# Patient Record
Sex: FEMALE | Race: ASIAN | NOT HISPANIC OR LATINO
[De-identification: names, ages, dates, MRNs, and addresses within clinical notes are randomized per-mention and may not be internally consistent; named-entity substitution may affect disease eponyms.]

---

## 2017-05-24 ENCOUNTER — TRANSCRIPTION ENCOUNTER (OUTPATIENT)
Age: 32
End: 2017-05-24

## 2018-10-26 ENCOUNTER — TRANSCRIPTION ENCOUNTER (OUTPATIENT)
Age: 33
End: 2018-10-26

## 2018-12-30 ENCOUNTER — EMERGENCY (EMERGENCY)
Facility: HOSPITAL | Age: 33
LOS: 1 days | Discharge: HOME | End: 2018-12-30
Attending: EMERGENCY MEDICINE | Admitting: EMERGENCY MEDICINE
Payer: COMMERCIAL

## 2018-12-30 VITALS
TEMPERATURE: 98 F | OXYGEN SATURATION: 100 % | SYSTOLIC BLOOD PRESSURE: 134 MMHG | HEART RATE: 76 BPM | RESPIRATION RATE: 18 BRPM | DIASTOLIC BLOOD PRESSURE: 79 MMHG

## 2018-12-30 VITALS
RESPIRATION RATE: 18 BRPM | DIASTOLIC BLOOD PRESSURE: 90 MMHG | TEMPERATURE: 98 F | SYSTOLIC BLOOD PRESSURE: 147 MMHG | OXYGEN SATURATION: 98 % | HEART RATE: 85 BPM

## 2018-12-30 LAB
ALBUMIN SERPL ELPH-MCNC: 4.9 G/DL — SIGNIFICANT CHANGE UP (ref 3.3–5)
ALP SERPL-CCNC: 62 U/L — SIGNIFICANT CHANGE UP (ref 40–120)
ALT FLD-CCNC: 50 U/L — HIGH (ref 4–33)
APPEARANCE UR: CLEAR — SIGNIFICANT CHANGE UP
AST SERPL-CCNC: 29 U/L — SIGNIFICANT CHANGE UP (ref 4–32)
BACTERIA # UR AUTO: SIGNIFICANT CHANGE UP
BASOPHILS # BLD AUTO: 0.09 K/UL — SIGNIFICANT CHANGE UP (ref 0–0.2)
BASOPHILS NFR BLD AUTO: 0.5 % — SIGNIFICANT CHANGE UP (ref 0–2)
BILIRUB SERPL-MCNC: 0.3 MG/DL — SIGNIFICANT CHANGE UP (ref 0.2–1.2)
BILIRUB UR-MCNC: NEGATIVE — SIGNIFICANT CHANGE UP
BLOOD UR QL VISUAL: HIGH
BUN SERPL-MCNC: 12 MG/DL — SIGNIFICANT CHANGE UP (ref 7–23)
CALCIUM SERPL-MCNC: 9.8 MG/DL — SIGNIFICANT CHANGE UP (ref 8.4–10.5)
CHLORIDE SERPL-SCNC: 99 MMOL/L — SIGNIFICANT CHANGE UP (ref 98–107)
CO2 SERPL-SCNC: 26 MMOL/L — SIGNIFICANT CHANGE UP (ref 22–31)
COLOR SPEC: COLORLESS — SIGNIFICANT CHANGE UP
CREAT SERPL-MCNC: 0.7 MG/DL — SIGNIFICANT CHANGE UP (ref 0.5–1.3)
EOSINOPHIL # BLD AUTO: 0.04 K/UL — SIGNIFICANT CHANGE UP (ref 0–0.5)
EOSINOPHIL NFR BLD AUTO: 0.2 % — SIGNIFICANT CHANGE UP (ref 0–6)
GLUCOSE SERPL-MCNC: 116 MG/DL — HIGH (ref 70–99)
GLUCOSE UR-MCNC: NEGATIVE — SIGNIFICANT CHANGE UP
HCT VFR BLD CALC: 48.1 % — HIGH (ref 34.5–45)
HGB BLD-MCNC: 14.9 G/DL — SIGNIFICANT CHANGE UP (ref 11.5–15.5)
HYALINE CASTS # UR AUTO: NEGATIVE — SIGNIFICANT CHANGE UP
IMM GRANULOCYTES # BLD AUTO: 0.11 # — SIGNIFICANT CHANGE UP
IMM GRANULOCYTES NFR BLD AUTO: 0.6 % — SIGNIFICANT CHANGE UP (ref 0–1.5)
KETONES UR-MCNC: NEGATIVE — SIGNIFICANT CHANGE UP
LEUKOCYTE ESTERASE UR-ACNC: NEGATIVE — SIGNIFICANT CHANGE UP
LIDOCAIN IGE QN: 35.2 U/L — SIGNIFICANT CHANGE UP (ref 7–60)
LYMPHOCYTES # BLD AUTO: 17 % — SIGNIFICANT CHANGE UP (ref 13–44)
LYMPHOCYTES # BLD AUTO: 2.94 K/UL — SIGNIFICANT CHANGE UP (ref 1–3.3)
MCHC RBC-ENTMCNC: 25.9 PG — LOW (ref 27–34)
MCHC RBC-ENTMCNC: 31 % — LOW (ref 32–36)
MCV RBC AUTO: 83.5 FL — SIGNIFICANT CHANGE UP (ref 80–100)
MONOCYTES # BLD AUTO: 0.69 K/UL — SIGNIFICANT CHANGE UP (ref 0–0.9)
MONOCYTES NFR BLD AUTO: 4 % — SIGNIFICANT CHANGE UP (ref 2–14)
NEUTROPHILS # BLD AUTO: 13.39 K/UL — HIGH (ref 1.8–7.4)
NEUTROPHILS NFR BLD AUTO: 77.7 % — HIGH (ref 43–77)
NITRITE UR-MCNC: NEGATIVE — SIGNIFICANT CHANGE UP
NRBC # FLD: 0 — SIGNIFICANT CHANGE UP
OB PNL STL: NEGATIVE — SIGNIFICANT CHANGE UP
PH UR: 6.5 — SIGNIFICANT CHANGE UP (ref 5–8)
PLATELET # BLD AUTO: 467 K/UL — HIGH (ref 150–400)
PMV BLD: 10 FL — SIGNIFICANT CHANGE UP (ref 7–13)
POTASSIUM SERPL-MCNC: 4.1 MMOL/L — SIGNIFICANT CHANGE UP (ref 3.5–5.3)
POTASSIUM SERPL-SCNC: 4.1 MMOL/L — SIGNIFICANT CHANGE UP (ref 3.5–5.3)
PROT SERPL-MCNC: 8.4 G/DL — HIGH (ref 6–8.3)
PROT UR-MCNC: NEGATIVE — SIGNIFICANT CHANGE UP
RBC # BLD: 5.76 M/UL — HIGH (ref 3.8–5.2)
RBC # FLD: 13.5 % — SIGNIFICANT CHANGE UP (ref 10.3–14.5)
RBC CASTS # UR COMP ASSIST: SIGNIFICANT CHANGE UP (ref 0–?)
SODIUM SERPL-SCNC: 137 MMOL/L — SIGNIFICANT CHANGE UP (ref 135–145)
SP GR SPEC: 1.01 — SIGNIFICANT CHANGE UP (ref 1–1.04)
SQUAMOUS # UR AUTO: SIGNIFICANT CHANGE UP
UROBILINOGEN FLD QL: NORMAL — SIGNIFICANT CHANGE UP
WBC # BLD: 17.26 K/UL — HIGH (ref 3.8–10.5)
WBC # FLD AUTO: 17.26 K/UL — HIGH (ref 3.8–10.5)
WBC UR QL: SIGNIFICANT CHANGE UP (ref 0–?)

## 2018-12-30 PROCEDURE — 99284 EMERGENCY DEPT VISIT MOD MDM: CPT

## 2018-12-30 RX ORDER — SODIUM CHLORIDE 9 MG/ML
1000 INJECTION INTRAMUSCULAR; INTRAVENOUS; SUBCUTANEOUS ONCE
Qty: 0 | Refills: 0 | Status: COMPLETED | OUTPATIENT
Start: 2018-12-30 | End: 2018-12-30

## 2018-12-30 RX ORDER — CIPROFLOXACIN LACTATE 400MG/40ML
500 VIAL (ML) INTRAVENOUS ONCE
Qty: 0 | Refills: 0 | Status: COMPLETED | OUTPATIENT
Start: 2018-12-30 | End: 2018-12-30

## 2018-12-30 RX ORDER — ONDANSETRON 8 MG/1
4 TABLET, FILM COATED ORAL ONCE
Qty: 0 | Refills: 0 | Status: COMPLETED | OUTPATIENT
Start: 2018-12-30 | End: 2018-12-30

## 2018-12-30 RX ADMIN — Medication 30 MILLILITER(S): at 21:10

## 2018-12-30 RX ADMIN — SODIUM CHLORIDE 1000 MILLILITER(S): 9 INJECTION INTRAMUSCULAR; INTRAVENOUS; SUBCUTANEOUS at 21:10

## 2018-12-30 RX ADMIN — Medication 500 MILLIGRAM(S): at 23:40

## 2018-12-30 RX ADMIN — ONDANSETRON 4 MILLIGRAM(S): 8 TABLET, FILM COATED ORAL at 21:10

## 2018-12-30 NOTE — ED ADULT NURSE NOTE - MODE OF DISCHARGE
Ambulatory
I personally performed the services described in the documentation, reviewed the documentation recorded by the scribe in my presence and it accurately and completely records my words and action.

## 2018-12-30 NOTE — ED PROVIDER NOTE - ATTENDING CONTRIBUTION TO CARE
agree with resident note  "33F presenting w/ bloody stool. Notes loose, bloody stool and drops of blood in urine for past 3 days a/w epigastric pain, non-radiating, and nausea. Recently completed course of augmentin 10 day course preceded by levaquin for 5 days for sinusitis."  Denies syncope, CP, SOB.  Denies rash    PE: well appearing; VSS: CTAB/L; s1 s2 no m/r/g abd soft/NT/ND ext: no edema    Imp: likely abx induced diarrhea; infectious diarrhea; will check labs for H/H, platelets if stable will send home on [facility]

## 2018-12-30 NOTE — ED PROVIDER NOTE - MEDICAL DECISION MAKING DETAILS
33F p/w bloody stools and hematuria for past 3 days in setting of recent abx use and travel, cocnerning for infectious diarrhea / underlying infxn  -labs, occult, supportive tx

## 2018-12-30 NOTE — ED PROVIDER NOTE - OBJECTIVE STATEMENT
33F presenting w/ bloody stool. Notes loose, bloody stool and drops of blood in urine for past 3 days a/w epigastric pain, non-radiating, and nausea. Recently completed course of augmentin 10 day course preceded by levaquin for 5 days for sinusitis. Denies F, CP/SOB, dysuria. LMP 2 months ago. Also traveled to [place] last month.

## 2018-12-30 NOTE — ED PROVIDER NOTE - NSFOLLOWUPINSTRUCTIONS_ED_ALL_ED_FT
Take cipro twice a day for 5 days    Drink plenty of fluids. Gatorade (or similar) is a good option as it provides electrolyte replacement. Eat foods that are dry and bland like pasta, bread, crackers, and rice.

## 2018-12-30 NOTE — ED ADULT NURSE NOTE - OBJECTIVE STATEMENT
pt on bed aox3 c/o epigastric, umbilical, suprapubic area pain for 1 week worse today. with nausea, blood in the urine and stool that started today. claimed had BM but hard to passed for few days. denies HA dizziness SOB palpitation vomiting diarrhea, denies PMHx MD at bedside eval the pt. will monitor

## 2018-12-30 NOTE — ED ADULT NURSE NOTE - NSIMPLEMENTINTERV_GEN_ALL_ED
Implemented All Universal Safety Interventions:  [place] to call system. Call bell, personal items and telephone within reach. Instruct patient to call for assistance. Room bathroom lighting operational. Non-slip footwear when patient is off stretcher. Physically safe environment: no spills, clutter or unnecessary equipment. Stretcher in lowest position, wheels locked, appropriate side rails in place.

## 2018-12-31 RX ORDER — CIPROFLOXACIN LACTATE 400MG/40ML
1 VIAL (ML) INTRAVENOUS
Qty: 10 | Refills: 0 | OUTPATIENT
Start: 2018-12-31 | End: 2019-01-04

## 2018-12-31 NOTE — ED POST DISCHARGE NOTE - RESULT SUMMARY
unsubmitted rx noted in EMR. I logged pt's pharmacy information and submitted rx. Encouraged pt to make followup appt with PMD.

## 2022-11-22 ENCOUNTER — OFFICE (OUTPATIENT)
Dept: URBAN - METROPOLITAN AREA CLINIC 90 | Facility: CLINIC | Age: 37
Setting detail: OPHTHALMOLOGY
End: 2022-11-22
Payer: COMMERCIAL

## 2022-11-22 DIAGNOSIS — H16.423: ICD-10-CM

## 2022-11-22 DIAGNOSIS — H16.223: ICD-10-CM

## 2022-11-22 DIAGNOSIS — H40.013: ICD-10-CM

## 2022-11-22 PROCEDURE — 92020 GONIOSCOPY: CPT | Performed by: OPHTHALMOLOGY

## 2022-11-22 PROCEDURE — 92014 COMPRE OPH EXAM EST PT 1/>: CPT | Performed by: OPHTHALMOLOGY

## 2022-11-22 PROCEDURE — 92133 CPTRZD OPH DX IMG PST SGM ON: CPT | Performed by: OPHTHALMOLOGY

## 2022-11-22 ASSESSMENT — REFRACTION_MANIFEST
OD_SPHERE: -2.75
OD_VA1: 20/20-
OD_SPHERE: -3.00
OS_CYLINDER: SPH
OD_VA1: 20/20
OD_SPHERE: -3.00
OD_VA1: 20/20
OS_VA1: 20/20-
OS_SPHERE: -2.75
OS_VA1: 20/20
OD_SPHERE: -2.75
OD_CYLINDER: SPH
OD_AXIS: 148
OD_CYLINDER: -0.75
OS_SPHERE: -2.75
OS_SPHERE: -2.75
OS_VA1: 20/20
OS_SPHERE: -2.75

## 2022-11-22 ASSESSMENT — REFRACTION_AUTOREFRACTION
OD_CYLINDER: -0.50
OD_AXIS: 003
OS_CYLINDER: -0.25
OD_SPHERE: -2.75
OS_AXIS: 026
OS_SPHERE: -2.50

## 2022-11-22 ASSESSMENT — VISUAL ACUITY
OS_BCVA: 20/20
OD_BCVA: 20/20-1

## 2022-11-22 ASSESSMENT — REFRACTION_CURRENTRX
OS_VPRISM_DIRECTION: SV
OS_SPHERE: -2.75
OD_OVR_VA: 20/
OS_OVR_VA: 20/
OD_AXIS: 180
OD_SPHERE: -3.00
OS_CYLINDER: 0.00
OD_AXIS: 180
OD_CYLINDER: 0.00
OS_CYLINDER: SPH
OD_SPHERE: -3.00
OS_OVR_VA: 20/
OS_AXIS: 180
OS_SPHERE: -2.75
OD_VPRISM_DIRECTION: SV
OD_VPRISM_DIRECTION: SV
OD_CYLINDER: 0.00
OS_VPRISM_DIRECTION: SV
OD_SPHERE: -3.00
OD_CYLINDER: 0.00
OS_AXIS: 80
OS_AXIS: 180
OS_VPRISM_DIRECTION: SV
OD_OVR_VA: 20/
OD_VPRISM_DIRECTION: SV
OS_SPHERE: -2.75
OS_CYLINDER: 0.00
OD_OVR_VA: 20/
OD_AXIS: 180
OS_OVR_VA: 20/

## 2022-11-22 ASSESSMENT — PACHYMETRY
OD_CT_UM: 580
OS_CT_CORRECTION: -3
OS_CT_UM: 581
OD_CT_CORRECTION: -3

## 2022-11-22 ASSESSMENT — SUPERFICIAL PUNCTATE KERATITIS (SPK)
OD_SPK: T
OS_SPK: T

## 2022-11-22 ASSESSMENT — VASCULARIZATION
OD_VASCULARIZATION: SUPERIOR PANNUS
OS_VASCULARIZATION: SUPERIOR PANNUS

## 2022-11-22 ASSESSMENT — KERATOMETRY
OD_K2POWER_DIOPTERS: 45.00
OS_AXISANGLE_DEGREES: 106
OD_AXISANGLE_DEGREES: 077
OD_K1POWER_DIOPTERS: 44.00
METHOD_AUTO_MANUAL: AUTO
OS_K2POWER_DIOPTERS: 43.25
OS_K1POWER_DIOPTERS: 42.25

## 2022-11-22 ASSESSMENT — AXIALLENGTH_DERIVED
OD_AL: 24.4216
OS_AL: 24.9657
OD_AL: 24.4739

## 2022-11-22 ASSESSMENT — TONOMETRY
OS_IOP_MMHG: 20
OD_IOP_MMHG: 20

## 2022-11-22 ASSESSMENT — SPHEQUIV_DERIVED
OD_SPHEQUIV: -3.125
OD_SPHEQUIV: -3
OS_SPHEQUIV: -2.625

## 2022-11-22 ASSESSMENT — CONFRONTATIONAL VISUAL FIELD TEST (CVF)
OS_FINDINGS: FULL
OD_FINDINGS: FULL

## 2023-08-21 ENCOUNTER — NON-APPOINTMENT (OUTPATIENT)
Age: 38
End: 2023-08-21

## 2023-08-28 ENCOUNTER — APPOINTMENT (OUTPATIENT)
Dept: CARDIOLOGY | Facility: CLINIC | Age: 38
End: 2023-08-28
Payer: COMMERCIAL

## 2023-08-28 ENCOUNTER — NON-APPOINTMENT (OUTPATIENT)
Age: 38
End: 2023-08-28

## 2023-08-28 VITALS
HEART RATE: 93 BPM | OXYGEN SATURATION: 99 % | DIASTOLIC BLOOD PRESSURE: 80 MMHG | SYSTOLIC BLOOD PRESSURE: 136 MMHG | WEIGHT: 190 LBS

## 2023-08-28 VITALS — SYSTOLIC BLOOD PRESSURE: 118 MMHG | DIASTOLIC BLOOD PRESSURE: 78 MMHG

## 2023-08-28 VITALS — DIASTOLIC BLOOD PRESSURE: 80 MMHG | SYSTOLIC BLOOD PRESSURE: 130 MMHG

## 2023-08-28 VITALS — SYSTOLIC BLOOD PRESSURE: 132 MMHG | DIASTOLIC BLOOD PRESSURE: 80 MMHG

## 2023-08-28 DIAGNOSIS — R42 DIZZINESS AND GIDDINESS: ICD-10-CM

## 2023-08-28 PROCEDURE — 93000 ELECTROCARDIOGRAM COMPLETE: CPT

## 2023-08-28 PROCEDURE — 99203 OFFICE O/P NEW LOW 30 MIN: CPT

## 2023-09-08 NOTE — HISTORY OF PRESENT ILLNESS
[FreeTextEntry1] : Dear Jenny, Thank you for referring her for cardiovascular evaluation.  She is a 38-year-old With a history of sleep apnea who is being seen for an episode of dizziness. She describes an episode of feeling "off" while sitting down lasting for minutes and resolving spontaneously.  There were no associated symptoms during these episodes. She is able to go about her usual activities including routine aerobic activity of high intensity training 5 to 6 days a week without any chest pain, shortness of breath or dizzy spells. She does have a history of borderline diabetes mellitus and hyperlipidemia.  There is no family history of premature coronary artery disease, but her father has atrial fibrillation and coronary disease. She currently takes no medications. No smoking history. No known allergies.

## 2023-09-08 NOTE — DISCUSSION/SUMMARY
[FreeTextEntry1] : She is a 38-year-old with a history of lightheadedness/dizziness episode that appeared unprovoked.  She is not orthostatic on examination today. I have scheduled her for an echocardiogram to exclude any structural heart disease Or pericardial effusion that could cause the symptoms. A cardiac monitor will be placed in order to exclude any transient arrhythmias that may explain her symptoms, though this is less likely.  [EKG obtained to assist in diagnosis and management of assessed problem(s)] : EKG obtained to assist in diagnosis and management of assessed problem(s)

## 2023-09-28 ENCOUNTER — NON-APPOINTMENT (OUTPATIENT)
Age: 38
End: 2023-09-28

## 2023-09-29 ENCOUNTER — APPOINTMENT (OUTPATIENT)
Dept: CARDIOLOGY | Facility: CLINIC | Age: 38
End: 2023-09-29
Payer: COMMERCIAL

## 2023-09-29 PROCEDURE — 93306 TTE W/DOPPLER COMPLETE: CPT

## 2023-11-28 ENCOUNTER — OFFICE (OUTPATIENT)
Dept: URBAN - METROPOLITAN AREA CLINIC 90 | Facility: CLINIC | Age: 38
Setting detail: OPHTHALMOLOGY
End: 2023-11-28
Payer: COMMERCIAL

## 2023-11-28 DIAGNOSIS — H40.013: ICD-10-CM

## 2023-11-28 DIAGNOSIS — H52.13: ICD-10-CM

## 2023-11-28 DIAGNOSIS — H16.423: ICD-10-CM

## 2023-11-28 DIAGNOSIS — H52.7: ICD-10-CM

## 2023-11-28 DIAGNOSIS — H16.223: ICD-10-CM

## 2023-11-28 PROCEDURE — 92083 EXTENDED VISUAL FIELD XM: CPT | Performed by: OPHTHALMOLOGY

## 2023-11-28 PROCEDURE — 92014 COMPRE OPH EXAM EST PT 1/>: CPT | Performed by: OPHTHALMOLOGY

## 2023-11-28 PROCEDURE — 92015 DETERMINE REFRACTIVE STATE: CPT | Performed by: OPHTHALMOLOGY

## 2023-11-28 ASSESSMENT — VASCULARIZATION
OS_VASCULARIZATION: SUPERIOR PANNUS
OD_VASCULARIZATION: SUPERIOR PANNUS

## 2023-11-28 ASSESSMENT — CONFRONTATIONAL VISUAL FIELD TEST (CVF)
OD_FINDINGS: FULL
OS_FINDINGS: FULL

## 2023-11-28 ASSESSMENT — SUPERFICIAL PUNCTATE KERATITIS (SPK)
OD_SPK: T
OS_SPK: T

## 2023-11-30 PROBLEM — H52.7 REFRACTIVE ERROR: Status: ACTIVE | Noted: 2023-11-28

## 2023-12-07 ASSESSMENT — AXIALLENGTH_DERIVED
OD_AL: 24.6321
OD_AL: 24.6321
OD_AL: 24.4739
OS_AL: 24.5731

## 2023-12-07 ASSESSMENT — REFRACTION_CURRENTRX
OS_SPHERE: -2.75
OS_SPHERE: -2.75
OD_SPHERE: -3.00
OD_VPRISM_DIRECTION: SV
OS_CYLINDER: SPH
OD_VPRISM_DIRECTION: SV
OS_AXIS: 180
OD_CYLINDER: 0.00
OD_SPHERE: -3.00
OD_CYLINDER: 0.00
OS_VPRISM_DIRECTION: SV
OS_CYLINDER: 0.00
OD_AXIS: 180
OS_AXIS: 80
OS_OVR_VA: 20/
OS_SPHERE: -2.75
OS_VPRISM_DIRECTION: SV
OD_OVR_VA: 20/
OD_VPRISM_DIRECTION: SV
OD_CYLINDER: SPH
OD_OVR_VA: 20/
OS_SPHERE: -2.75
OS_VPRISM_DIRECTION: SV
OD_AXIS: 180
OS_OVR_VA: 20/
OS_CYLINDER: SPH
OD_OVR_VA: 20/
OD_SPHERE: -3.00
OD_SPHERE: -3.00
OS_AXIS: 180
OS_SPHERE: -2.75
OD_CYLINDER: SPH
OD_VPRISM_DIRECTION: SV
OS_CYLINDER: 0.00
OS_CYLINDER: SPH
OS_VPRISM_DIRECTION: SV
OD_VPRISM_DIRECTION: SV
OD_SPHERE: -3.00
OD_CYLINDER: 0.00
OS_OVR_VA: 20/
OS_VPRISM_DIRECTION: SV
OD_AXIS: 180

## 2023-12-07 ASSESSMENT — KERATOMETRY
OS_K1POWER_DIOPTERS: 43.75
OD_AXISANGLE_DEGREES: 082
METHOD_AUTO_MANUAL: AUTO
OD_K1POWER_DIOPTERS: 44.00
OD_K2POWER_DIOPTERS: 45.00
OS_K2POWER_DIOPTERS: 44.75
OS_AXISANGLE_DEGREES: 102

## 2023-12-07 ASSESSMENT — REFRACTION_MANIFEST
OD_SPHERE: -3.25
OS_SPHERE: -2.75
OS_VA1: 20/20
OS_SPHERE: -2.75
OD_CYLINDER: -0.75
OD_CYLINDER: SPH
OS_SPHERE: -2.75
OD_VA1: 20/20
OD_VA1: 20/20
OS_CYLINDER: SPH
OS_VA1: 20/20
OS_VA1: 20/20
OD_AXIS: 010
OD_CYLINDER: -0.50
OD_SPHERE: -3.00
OS_CYLINDER: SPH
OS_VA1: 20/20-
OS_SPHERE: -2.75
OD_SPHERE: -2.75
OS_SPHERE: -3.00
OD_AXIS: 148
OD_VA1: 20/20-
OD_SPHERE: -2.75
OD_VA1: 20/20
OD_SPHERE: -3.00

## 2023-12-07 ASSESSMENT — REFRACTION_AUTOREFRACTION
OS_CYLINDER: -0.25
OS_SPHERE: -3.00
OD_SPHERE: -3.25
OD_CYLINDER: -0.50
OS_AXIS: 168
OD_AXIS: 009

## 2023-12-07 ASSESSMENT — SPHEQUIV_DERIVED
OD_SPHEQUIV: -3.5
OD_SPHEQUIV: -3.5
OS_SPHEQUIV: -3.125
OD_SPHEQUIV: -3.125

## 2024-02-01 ENCOUNTER — TRANSCRIPTION ENCOUNTER (OUTPATIENT)
Age: 39
End: 2024-02-01

## 2024-02-01 ENCOUNTER — APPOINTMENT (OUTPATIENT)
Dept: INTERNAL MEDICINE | Facility: CLINIC | Age: 39
End: 2024-02-01
Payer: COMMERCIAL

## 2024-02-01 VITALS
OXYGEN SATURATION: 98 % | BODY MASS INDEX: 37.19 KG/M2 | HEART RATE: 95 BPM | DIASTOLIC BLOOD PRESSURE: 87 MMHG | TEMPERATURE: 98.1 F | SYSTOLIC BLOOD PRESSURE: 169 MMHG | HEIGHT: 61 IN | WEIGHT: 197 LBS

## 2024-02-01 VITALS — SYSTOLIC BLOOD PRESSURE: 150 MMHG | DIASTOLIC BLOOD PRESSURE: 90 MMHG

## 2024-02-01 DIAGNOSIS — R73.03 PREDIABETES.: ICD-10-CM

## 2024-02-01 DIAGNOSIS — Z12.4 ENCOUNTER FOR SCREENING FOR MALIGNANT NEOPLASM OF CERVIX: ICD-10-CM

## 2024-02-01 DIAGNOSIS — G47.30 SLEEP APNEA, UNSPECIFIED: ICD-10-CM

## 2024-02-01 DIAGNOSIS — E55.9 VITAMIN D DEFICIENCY, UNSPECIFIED: ICD-10-CM

## 2024-02-01 DIAGNOSIS — Z00.00 ENCOUNTER FOR GENERAL ADULT MEDICAL EXAMINATION W/OUT ABNORMAL FINDINGS: ICD-10-CM

## 2024-02-01 PROCEDURE — 99385 PREV VISIT NEW AGE 18-39: CPT

## 2024-02-01 NOTE — HISTORY OF PRESENT ILLNESS
[FreeTextEntry1] : annual [de-identified] : 37 yo F with HAM, hx dizziness presents for annual.   Pt has had workup by cardiology that has been unremarkable. She also had MRI brain done with neuro that was also unremarkable. She still does get occasional dizziness when she is at rest. She does weight training and cardio daily. She denies feeling any dizziness during those times. Denies overwhelming stressors.   HAM-- wears CPAP  Needs new GYN, not UTD pap.

## 2024-02-05 ENCOUNTER — TRANSCRIPTION ENCOUNTER (OUTPATIENT)
Age: 39
End: 2024-02-05

## 2024-02-05 LAB
25(OH)D3 SERPL-MCNC: 24.1 NG/ML
ALBUMIN SERPL ELPH-MCNC: 5.1 G/DL
ALP BLD-CCNC: 62 U/L
ALT SERPL-CCNC: 33 U/L
ANION GAP SERPL CALC-SCNC: 13 MMOL/L
AST SERPL-CCNC: 26 U/L
BASOPHILS # BLD AUTO: 0.1 K/UL
BASOPHILS NFR BLD AUTO: 1.4 %
BILIRUB SERPL-MCNC: 0.3 MG/DL
BUN SERPL-MCNC: 18 MG/DL
CALCIUM SERPL-MCNC: 9.4 MG/DL
CHLORIDE SERPL-SCNC: 103 MMOL/L
CHOLEST SERPL-MCNC: 213 MG/DL
CO2 SERPL-SCNC: 23 MMOL/L
CREAT SERPL-MCNC: 0.72 MG/DL
EGFR: 110 ML/MIN/1.73M2
EOSINOPHIL # BLD AUTO: 0.16 K/UL
EOSINOPHIL NFR BLD AUTO: 2.3 %
ESTIMATED AVERAGE GLUCOSE: 128 MG/DL
GLUCOSE SERPL-MCNC: 119 MG/DL
HBA1C MFR BLD HPLC: 6.1 %
HCT VFR BLD CALC: 42.1 %
HDLC SERPL-MCNC: 51 MG/DL
HGB BLD-MCNC: 14 G/DL
IMM GRANULOCYTES NFR BLD AUTO: 0.4 %
LDLC SERPL CALC-MCNC: 142 MG/DL
LYMPHOCYTES # BLD AUTO: 2.32 K/UL
LYMPHOCYTES NFR BLD AUTO: 33.1 %
MAN DIFF?: NORMAL
MCHC RBC-ENTMCNC: 27.3 PG
MCHC RBC-ENTMCNC: 33.3 GM/DL
MCV RBC AUTO: 82.2 FL
MONOCYTES # BLD AUTO: 0.53 K/UL
MONOCYTES NFR BLD AUTO: 7.6 %
NEUTROPHILS # BLD AUTO: 3.86 K/UL
NEUTROPHILS NFR BLD AUTO: 55.2 %
NONHDLC SERPL-MCNC: 162 MG/DL
PLATELET # BLD AUTO: 395 K/UL
POTASSIUM SERPL-SCNC: 4.5 MMOL/L
PROT SERPL-MCNC: 7.5 G/DL
RBC # BLD: 5.12 M/UL
RBC # FLD: 13.7 %
SODIUM SERPL-SCNC: 138 MMOL/L
TRIGL SERPL-MCNC: 112 MG/DL
TSH SERPL-ACNC: 2.28 UIU/ML
VIT B12 SERPL-MCNC: 884 PG/ML
WBC # FLD AUTO: 7 K/UL

## 2024-03-14 ENCOUNTER — APPOINTMENT (OUTPATIENT)
Dept: OBGYN | Facility: CLINIC | Age: 39
End: 2024-03-14
Payer: COMMERCIAL

## 2024-03-14 VITALS — SYSTOLIC BLOOD PRESSURE: 154 MMHG | WEIGHT: 195 LBS | DIASTOLIC BLOOD PRESSURE: 90 MMHG | BODY MASS INDEX: 36.85 KG/M2

## 2024-03-14 DIAGNOSIS — Z01.419 ENCOUNTER FOR GYNECOLOGICAL EXAMINATION (GENERAL) (ROUTINE) W/OUT ABNORMAL FINDINGS: ICD-10-CM

## 2024-03-14 PROCEDURE — 99385 PREV VISIT NEW AGE 18-39: CPT

## 2024-03-14 PROCEDURE — G0444 DEPRESSION SCREEN ANNUAL: CPT

## 2024-03-14 NOTE — HISTORY OF PRESENT ILLNESS
[FreeTextEntry1] : LMP  2/29 39yo G0 here for wellness and pap, last pap 2 years ago. Reg menses, no dysmenorrhea or heavy flow. Not currently SA, not considering pregnancy. Sometimes has "bump" in vulvar area with periods. Also hx of elev BPs at MD offices, reports BPs at home are normal.

## 2024-03-14 NOTE — PLAN
[FreeTextEntry1] : Pap today. Discussed continued monitoring of BPs. RTO wellness or prn.  Patient screened for depression - no signs of clinical depression. PHQ-9 scores reviewed over the course of the visit 5-10minutes of face to face time. Follow up with changes in mood including other symptoms of anxiety.

## 2024-03-17 LAB — HPV HIGH+LOW RISK DNA PNL CVX: NOT DETECTED

## 2024-03-18 LAB — CYTOLOGY CVX/VAG DOC THIN PREP: NORMAL

## 2024-11-15 ENCOUNTER — OFFICE (OUTPATIENT)
Age: 39
Setting detail: OPHTHALMOLOGY
End: 2024-11-15
Payer: COMMERCIAL

## 2024-11-15 DIAGNOSIS — H40.013: ICD-10-CM

## 2024-11-15 DIAGNOSIS — H16.223: ICD-10-CM

## 2024-11-15 DIAGNOSIS — H16.423: ICD-10-CM

## 2024-11-15 PROCEDURE — 92133 CPTRZD OPH DX IMG PST SGM ON: CPT | Performed by: OPHTHALMOLOGY

## 2024-11-15 PROCEDURE — 92014 COMPRE OPH EXAM EST PT 1/>: CPT | Performed by: OPHTHALMOLOGY

## 2024-11-15 PROCEDURE — 92083 EXTENDED VISUAL FIELD XM: CPT | Performed by: OPHTHALMOLOGY

## 2024-11-15 ASSESSMENT — VISUAL ACUITY
OD_BCVA: 20/20
OS_BCVA: 20/20

## 2024-11-15 ASSESSMENT — REFRACTION_AUTOREFRACTION
OD_AXIS: 005
OS_CYLINDER: -0.25
OD_CYLINDER: -0.50
OS_AXIS: 001
OS_SPHERE: -3.25
OD_SPHERE: -3.50

## 2024-11-15 ASSESSMENT — REFRACTION_CURRENTRX
OD_VPRISM_DIRECTION: SV
OD_CYLINDER: SPH
OS_CYLINDER: SPH
OD_SPHERE: -3.00
OD_VPRISM_DIRECTION: SV
OS_SPHERE: -2.75
OS_CYLINDER: SPH
OS_SPHERE: -2.75
OS_SPHERE: -2.75
OD_OVR_VA: 20/
OS_AXIS: 180
OS_VPRISM_DIRECTION: SV
OS_SPHERE: -2.75
OS_CYLINDER: 0.00
OS_VPRISM_DIRECTION: SV
OD_VPRISM_DIRECTION: SV
OS_VPRISM_DIRECTION: SV
OD_OVR_VA: 20/
OD_SPHERE: -3.00
OS_CYLINDER: 0.00
OD_CYLINDER: 0.00
OS_AXIS: 180
OD_OVR_VA: 20/
OS_AXIS: 80
OS_VPRISM_DIRECTION: SV
OD_AXIS: 180
OD_VPRISM_DIRECTION: SV
OD_CYLINDER: SPH
OD_SPHERE: -3.00
OD_CYLINDER: 0.00
OS_OVR_VA: 20/
OS_CYLINDER: SPH
OD_AXIS: 180
OS_OVR_VA: 20/
OS_OVR_VA: 20/
OD_CYLINDER: 0.00
OD_SPHERE: -3.00
OD_VPRISM_DIRECTION: SV
OS_SPHERE: -2.75
OS_VPRISM_DIRECTION: SV
OD_SPHERE: -3.00
OD_AXIS: 180

## 2024-11-15 ASSESSMENT — REFRACTION_MANIFEST
OS_SPHERE: -2.75
OD_SPHERE: -2.75
OD_SPHERE: -3.00
OS_SPHERE: -2.75
OS_VA1: 20/20
OD_VA1: 20/20
OS_SPHERE: -2.75
OD_VA1: 20/20-
OS_SPHERE: -3.00
OS_CYLINDER: SPH
OD_CYLINDER: -0.75
OS_VA1: 20/20-
OS_SPHERE: -2.75
OD_AXIS: 010
OD_SPHERE: -3.00
OS_VA1: 20/20
OS_CYLINDER: SPH
OD_SPHERE: -3.25
OD_AXIS: 148
OD_SPHERE: -2.75
OD_VA1: 20/20
OD_CYLINDER: SPH
OS_VA1: 20/20
OD_VA1: 20/20
OD_CYLINDER: -0.50

## 2024-11-15 ASSESSMENT — KERATOMETRY
OD_AXISANGLE_DEGREES: 083
OD_K1POWER_DIOPTERS: 43.75
OS_K1POWER_DIOPTERS: 43.75
OS_K2POWER_DIOPTERS: 44.50
OD_K2POWER_DIOPTERS: 45.00
OS_AXISANGLE_DEGREES: 109
METHOD_AUTO_MANUAL: AUTO

## 2024-11-15 ASSESSMENT — PACHYMETRY
OD_CT_UM: 580
OS_CT_UM: 581
OS_CT_CORRECTION: -3
OD_CT_CORRECTION: -3

## 2024-11-15 ASSESSMENT — VASCULARIZATION
OD_VASCULARIZATION: SUPERIOR PANNUS
OS_VASCULARIZATION: SUPERIOR PANNUS

## 2024-11-15 ASSESSMENT — CONFRONTATIONAL VISUAL FIELD TEST (CVF)
OS_FINDINGS: FULL
OD_FINDINGS: FULL

## 2024-11-15 ASSESSMENT — SUPERFICIAL PUNCTATE KERATITIS (SPK)
OS_SPK: T
OD_SPK: T